# Patient Record
Sex: MALE | Race: WHITE
[De-identification: names, ages, dates, MRNs, and addresses within clinical notes are randomized per-mention and may not be internally consistent; named-entity substitution may affect disease eponyms.]

---

## 2019-12-31 ENCOUNTER — HOSPITAL ENCOUNTER (EMERGENCY)
Dept: HOSPITAL 92 - ERS | Age: 74
Discharge: NURSING, REHAB OR LONG-TERM CARE | End: 2019-12-31
Payer: MEDICARE

## 2019-12-31 DIAGNOSIS — Z04.3: Primary | ICD-10-CM

## 2019-12-31 DIAGNOSIS — K21.9: ICD-10-CM
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
## 2019-12-31 DIAGNOSIS — F41.9: ICD-10-CM

## 2019-12-31 DIAGNOSIS — M19.90: ICD-10-CM

## 2019-12-31 DIAGNOSIS — G40.909: ICD-10-CM

## 2019-12-31 DIAGNOSIS — Z79.899: ICD-10-CM

## 2019-12-31 DIAGNOSIS — F03.90: ICD-10-CM

## 2019-12-31 PROCEDURE — 99283 EMERGENCY DEPT VISIT LOW MDM: CPT

## 2020-05-14 ENCOUNTER — HOSPITAL ENCOUNTER (EMERGENCY)
Dept: HOSPITAL 92 - ERS | Age: 75
Discharge: HOME | End: 2020-05-14
Payer: MEDICARE

## 2020-05-14 DIAGNOSIS — R41.82: Primary | ICD-10-CM

## 2020-05-14 DIAGNOSIS — G40.909: ICD-10-CM

## 2020-05-14 DIAGNOSIS — Z79.899: ICD-10-CM

## 2020-05-14 DIAGNOSIS — F03.90: ICD-10-CM

## 2020-05-14 DIAGNOSIS — F41.9: ICD-10-CM

## 2020-05-14 DIAGNOSIS — M19.90: ICD-10-CM

## 2020-05-14 LAB
ALBUMIN SERPL BCG-MCNC: 4.1 G/DL (ref 3.4–4.8)
ALP SERPL-CCNC: 54 U/L (ref 40–110)
ALT SERPL W P-5'-P-CCNC: (no result) U/L (ref 8–55)
ANION GAP SERPL CALC-SCNC: 13 MMOL/L (ref 10–20)
AST SERPL-CCNC: 15 U/L (ref 5–34)
BASOPHILS # BLD AUTO: 0 THOU/UL (ref 0–0.2)
BASOPHILS NFR BLD AUTO: 0.4 % (ref 0–1)
BILIRUB SERPL-MCNC: 0.7 MG/DL (ref 0.2–1.2)
BUN SERPL-MCNC: 15 MG/DL (ref 8.4–25.7)
CALCIUM SERPL-MCNC: 8.9 MG/DL (ref 7.8–10.44)
CHLORIDE SERPL-SCNC: 104 MMOL/L (ref 98–107)
CO2 SERPL-SCNC: 26 MMOL/L (ref 23–31)
CREAT CL PREDICTED SERPL C-G-VRATE: 0 ML/MIN (ref 70–130)
EOSINOPHIL # BLD AUTO: 0.1 THOU/UL (ref 0–0.7)
EOSINOPHIL NFR BLD AUTO: 1.2 % (ref 0–10)
GLOBULIN SER CALC-MCNC: 2.5 G/DL (ref 2.4–3.5)
GLUCOSE SERPL-MCNC: 111 MG/DL (ref 83–110)
HGB BLD-MCNC: 15.5 G/DL (ref 14–18)
LYMPHOCYTES # BLD: 0.9 THOU/UL (ref 1.2–3.4)
LYMPHOCYTES NFR BLD AUTO: 12.8 % (ref 21–51)
MCH RBC QN AUTO: 32.6 PG (ref 27–31)
MCV RBC AUTO: 99.1 FL (ref 78–98)
MONOCYTES # BLD AUTO: 0.7 THOU/UL (ref 0.11–0.59)
MONOCYTES NFR BLD AUTO: 9.3 % (ref 0–10)
NEUTROPHILS # BLD AUTO: 5.6 THOU/UL (ref 1.4–6.5)
NEUTROPHILS NFR BLD AUTO: 76.3 % (ref 42–75)
PLATELET # BLD AUTO: 109 THOU/UL (ref 130–400)
POTASSIUM SERPL-SCNC: 4.1 MMOL/L (ref 3.5–5.1)
RBC # BLD AUTO: 4.75 MILL/UL (ref 4.7–6.1)
SODIUM SERPL-SCNC: 139 MMOL/L (ref 136–145)
WBC # BLD AUTO: 7.4 THOU/UL (ref 4.8–10.8)

## 2020-05-14 PROCEDURE — 93005 ELECTROCARDIOGRAM TRACING: CPT

## 2020-05-14 PROCEDURE — 85025 COMPLETE CBC W/AUTO DIFF WBC: CPT

## 2020-05-14 PROCEDURE — 80053 COMPREHEN METABOLIC PANEL: CPT

## 2020-05-14 PROCEDURE — 71045 X-RAY EXAM CHEST 1 VIEW: CPT

## 2020-05-14 PROCEDURE — 84484 ASSAY OF TROPONIN QUANT: CPT

## 2020-05-14 PROCEDURE — 81003 URINALYSIS AUTO W/O SCOPE: CPT

## 2020-05-14 PROCEDURE — 36415 COLL VENOUS BLD VENIPUNCTURE: CPT

## 2020-05-14 PROCEDURE — 51701 INSERT BLADDER CATHETER: CPT

## 2020-05-14 PROCEDURE — 80177 DRUG SCRN QUAN LEVETIRACETAM: CPT

## 2020-05-14 NOTE — RAD
PORTABLE CHEST ONE VIEW:

5/14/20 at 11:48 a.m.

 

HISTORY:

Syncope. 

 

FINDINGS: 

The heart size is normal. No focal areas of consolidation, pneumothoraces, or pleural effusions are s
een. There is evidence of old granulomatous disease. 

 

IMPRESSION: 

No acute process. 

 

POS: MAYITO

## 2020-05-30 ENCOUNTER — HOSPITAL ENCOUNTER (INPATIENT)
Dept: HOSPITAL 92 - ERS | Age: 75
LOS: 3 days | Discharge: HOSPICE | DRG: 871 | End: 2020-06-02
Attending: INTERNAL MEDICINE | Admitting: INTERNAL MEDICINE
Payer: MEDICARE

## 2020-05-30 VITALS — BODY MASS INDEX: 21.9 KG/M2

## 2020-05-30 DIAGNOSIS — Z20.828: ICD-10-CM

## 2020-05-30 DIAGNOSIS — G20: ICD-10-CM

## 2020-05-30 DIAGNOSIS — F02.80: ICD-10-CM

## 2020-05-30 DIAGNOSIS — K21.9: ICD-10-CM

## 2020-05-30 DIAGNOSIS — D69.6: ICD-10-CM

## 2020-05-30 DIAGNOSIS — R47.01: ICD-10-CM

## 2020-05-30 DIAGNOSIS — E87.2: ICD-10-CM

## 2020-05-30 DIAGNOSIS — Z79.1: ICD-10-CM

## 2020-05-30 DIAGNOSIS — G93.41: ICD-10-CM

## 2020-05-30 DIAGNOSIS — Z66: ICD-10-CM

## 2020-05-30 DIAGNOSIS — F41.9: ICD-10-CM

## 2020-05-30 DIAGNOSIS — K82.A1: ICD-10-CM

## 2020-05-30 DIAGNOSIS — R65.20: ICD-10-CM

## 2020-05-30 DIAGNOSIS — K80.00: ICD-10-CM

## 2020-05-30 DIAGNOSIS — Z79.899: ICD-10-CM

## 2020-05-30 DIAGNOSIS — G40.909: ICD-10-CM

## 2020-05-30 DIAGNOSIS — M19.90: ICD-10-CM

## 2020-05-30 DIAGNOSIS — K75.0: ICD-10-CM

## 2020-05-30 DIAGNOSIS — J18.9: ICD-10-CM

## 2020-05-30 DIAGNOSIS — Z88.0: ICD-10-CM

## 2020-05-30 DIAGNOSIS — A40.8: Primary | ICD-10-CM

## 2020-05-30 DIAGNOSIS — Z51.5: ICD-10-CM

## 2020-05-30 LAB
ALBUMIN SERPL BCG-MCNC: 3.2 G/DL (ref 3.4–4.8)
ALP SERPL-CCNC: 85 U/L (ref 40–110)
ALT SERPL W P-5'-P-CCNC: 26 U/L (ref 8–55)
ANION GAP SERPL CALC-SCNC: 17 MMOL/L (ref 10–20)
AST SERPL-CCNC: 38 U/L (ref 5–34)
BASOPHILS # BLD AUTO: 0.1 THOU/UL (ref 0–0.2)
BASOPHILS NFR BLD AUTO: 0.8 % (ref 0–1)
BILIRUB SERPL-MCNC: 1.7 MG/DL (ref 0.2–1.2)
BUN SERPL-MCNC: 19 MG/DL (ref 8.4–25.7)
CALCIUM SERPL-MCNC: 7.9 MG/DL (ref 7.8–10.44)
CHLORIDE SERPL-SCNC: 102 MMOL/L (ref 98–107)
CK SERPL-CCNC: 510 U/L (ref 30–200)
CO2 SERPL-SCNC: 24 MMOL/L (ref 23–31)
CREAT CL PREDICTED SERPL C-G-VRATE: 0 ML/MIN (ref 70–130)
EOSINOPHIL # BLD AUTO: 0 THOU/UL (ref 0–0.7)
EOSINOPHIL NFR BLD AUTO: 0.6 % (ref 0–10)
GLOBULIN SER CALC-MCNC: 2.2 G/DL (ref 2.4–3.5)
GLUCOSE SERPL-MCNC: 146 MG/DL (ref 83–110)
HGB BLD-MCNC: 13 G/DL (ref 14–18)
LIPASE SERPL-CCNC: 36 U/L (ref 8–78)
LYMPHOCYTES # BLD: 0.2 THOU/UL (ref 1.2–3.4)
LYMPHOCYTES NFR BLD AUTO: 3.2 % (ref 21–51)
MCH RBC QN AUTO: 32.8 PG (ref 27–31)
MCV RBC AUTO: 99.6 FL (ref 78–98)
MONOCYTES # BLD AUTO: 0.4 THOU/UL (ref 0.11–0.59)
MONOCYTES NFR BLD AUTO: 5.6 % (ref 0–10)
NEUTROPHILS # BLD AUTO: 6.6 THOU/UL (ref 1.4–6.5)
NEUTROPHILS NFR BLD AUTO: 89.7 % (ref 42–75)
PLATELET # BLD AUTO: 105 THOU/UL (ref 130–400)
POTASSIUM SERPL-SCNC: 3.5 MMOL/L (ref 3.5–5.1)
PROT UR STRIP.AUTO-MCNC: 30 MG/DL
RBC # BLD AUTO: 3.98 MILL/UL (ref 4.7–6.1)
RBC UR QL AUTO: (no result) HPF (ref 0–3)
SODIUM SERPL-SCNC: 139 MMOL/L (ref 136–145)
URNS CMNT MICRO: (no result)
WBC # BLD AUTO: 7.3 THOU/UL (ref 4.8–10.8)
WBC UR QL AUTO: (no result) HPF (ref 0–3)

## 2020-05-30 PROCEDURE — 87077 CULTURE AEROBIC IDENTIFY: CPT

## 2020-05-30 PROCEDURE — 71275 CT ANGIOGRAPHY CHEST: CPT

## 2020-05-30 PROCEDURE — 51701 INSERT BLADDER CATHETER: CPT

## 2020-05-30 PROCEDURE — U0003 INFECTIOUS AGENT DETECTION BY NUCLEIC ACID (DNA OR RNA); SEVERE ACUTE RESPIRATORY SYNDROME CORONAVIRUS 2 (SARS-COV-2) (CORONAVIRUS DISEASE [COVID-19]), AMPLIFIED PROBE TECHNIQUE, MAKING USE OF HIGH THROUGHPUT TECHNOLOGIES AS DESCRIBED BY CMS-2020-01-R: HCPCS

## 2020-05-30 PROCEDURE — 96365 THER/PROPH/DIAG IV INF INIT: CPT

## 2020-05-30 PROCEDURE — 51703 INSERT BLADDER CATH COMPLEX: CPT

## 2020-05-30 PROCEDURE — 81015 MICROSCOPIC EXAM OF URINE: CPT

## 2020-05-30 PROCEDURE — 80053 COMPREHEN METABOLIC PANEL: CPT

## 2020-05-30 PROCEDURE — 96361 HYDRATE IV INFUSION ADD-ON: CPT

## 2020-05-30 PROCEDURE — 87149 DNA/RNA DIRECT PROBE: CPT

## 2020-05-30 PROCEDURE — 74177 CT ABD & PELVIS W/CONTRAST: CPT

## 2020-05-30 PROCEDURE — 71045 X-RAY EXAM CHEST 1 VIEW: CPT

## 2020-05-30 PROCEDURE — 81003 URINALYSIS AUTO W/O SCOPE: CPT

## 2020-05-30 PROCEDURE — 85379 FIBRIN DEGRADATION QUANT: CPT

## 2020-05-30 PROCEDURE — 36415 COLL VENOUS BLD VENIPUNCTURE: CPT

## 2020-05-30 PROCEDURE — 8E0ZXY6 ISOLATION: ICD-10-PCS | Performed by: INTERNAL MEDICINE

## 2020-05-30 PROCEDURE — 84484 ASSAY OF TROPONIN QUANT: CPT

## 2020-05-30 PROCEDURE — 85025 COMPLETE CBC W/AUTO DIFF WBC: CPT

## 2020-05-30 PROCEDURE — 87040 BLOOD CULTURE FOR BACTERIA: CPT

## 2020-05-30 PROCEDURE — 87186 SC STD MICRODIL/AGAR DIL: CPT

## 2020-05-30 PROCEDURE — 83690 ASSAY OF LIPASE: CPT

## 2020-05-30 PROCEDURE — 86769 SARS-COV-2 COVID-19 ANTIBODY: CPT

## 2020-05-30 PROCEDURE — 87635 SARS-COV-2 COVID-19 AMP PRB: CPT

## 2020-05-30 PROCEDURE — 83605 ASSAY OF LACTIC ACID: CPT

## 2020-05-30 PROCEDURE — 93005 ELECTROCARDIOGRAM TRACING: CPT

## 2020-05-30 PROCEDURE — 82550 ASSAY OF CK (CPK): CPT

## 2020-05-30 PROCEDURE — 96367 TX/PROPH/DG ADDL SEQ IV INF: CPT

## 2020-05-30 PROCEDURE — 83880 ASSAY OF NATRIURETIC PEPTIDE: CPT

## 2020-05-30 RX ADMIN — RISPERIDONE SCH MG: 1 TABLET, ORALLY DISINTEGRATING ORAL at 22:49

## 2020-05-30 RX ADMIN — Medication SCH ML: at 22:49

## 2020-05-30 RX ADMIN — RISPERIDONE SCH: 1 TABLET, ORALLY DISINTEGRATING ORAL at 23:24

## 2020-05-30 NOTE — CT
CTA Angio Chest W WO Con



5/30/2020 1:49 PM



Indication:  Fever, shortness of breath, tachycardia and elevated d-dimer



Technique:  Multiple CTA images were obtained of the thorax with IV contrast. 3-D rendering: MIP jeri
nstructed images were created and reviewed.



Comparison:  Chest radiograph dated May 14, 2020



Findings:



Pulmonary arteries:  No central or segmental pulmonary embolus is evident.



Heart and Aorta:  There are coronary artery and thoracic aortic calcifications. There is mild cardiom
egaly.



Mediastinum:Normal appearing.  No enlarged lymph nodes.



Lungs:Airspace consolidation is present within the posterior lateral right lower lobe. There is subse
gmental volume loss involving both lower lobes.



Pleural space:  Small bilateral pleural effusions.



Upper Abdomen:  There is a small hiatal hernia. There is a hypodense oval lesion involving the expect
ed region of the superior pole left kidney that is incompletely characterized. This measures 4.6 cm

and may reflect a prominent exophytic cyst.



Osseous Structures:  No acute osseous abnormality.



Soft tissues:No abnormality.



Other findings:None.



Impression:

1.  No central or segmental pulmonary embolus.

2.  Right lower lobe pneumonia

3.  Mild cardiomegaly and small bilateral pleural effusions suspicious for a component of mild CHF. R
ecommend correlation with clinical examination.

4.  Partially visualized hypodense lesion involving the region of the superior pole left kidney suspi
cious for possible cyst. Follow-up renal ultrasound is recommended.





Reported By: Christiano Villalpando 

Electronically Signed:  5/30/2020 4:24 PM

## 2020-05-30 NOTE — RAD
PORTABLE CHEST ONE VIEW:

5/30/20 at 1:05 p.m.

 

HISTORY: 

Low oxygen saturation and fever. 

 

COMPARISON: 

Earlier exam of 12:46 a.m. from same date. 

 

FINDINGS/IMPRESSION: 

No significant interval changes are seen. 

 

POS: OFF

## 2020-05-30 NOTE — PDOC.HHP
Hospitalist HPI





- History of Present Illness


Fever, concern for COVID 19


History of Present Illness: 





75-year-old gentleman with past medical history of Parkinson's disease, dementia

, traumatic brain injury, seizure disorder, and osteoarthritis presents with 

fever and symptoms of Covid 19. Patient is nonverbal a baseline and has lived 

in nursing facility for the past four years. History is primarily obtained from 

the patient's wife and son in person. Patient has had worsening trouble 

breathing with shortness of breath and hypoxia at nursing facility, he was 

found have a temperature and was sent over to the emergency department for 

further evaluation. Upon arrival he had a temperature reportedly of 103.9, CT 

scan of the chest was performed for and elevated D dimer which was negative for 

pulmonary embolism however demonstrated a right lower lobe pneumonia. Patient's 

labs are suggestive of Covid 19, he has a normal white blood cell count, 

elevated D dimer, low platelets, and a mild increase in his liver function 

tests. I find the patient in the emergency department he is laying flat in bed 

in no apparent distress his eyes are open any tracks around the room. He opens 

his mouth and attempts to make a few words though they are incomprehensible. 

The patient was diagnosed with sepsis in the emergency department and 

recommended admission for further management. I had a long discussion with 

patients wife and son in the conference room in the emergency department, 

greater than one hour of face-to-face time was spent with patient's family. The 

patient has lived for the fast past four years in skilled nursing facility, 

this current nursing facility has an outbreak of Covid 19 with "18 out of 22 

members who have tested positive" for the infection, per the patient's wife. 

The patient had a test on Tuesday of last week with results coming back on 

Friday being "negative". I discussed with them the possibility of false-

negative testing and the limitations of the current Covid 19 test. I had 

discussed with them at length their goals of care and they state that he is a 

do not attempt resuscitation and do not intubate at baseline. They are not 

interested in aggressive measures, they specifically do not want to start 

antibiotics or other medications to treat COVID. They want only comfort care 

measures, they do not want anymore painful or uncomfortable testing including 

no more lab draws and they are interested in Palliative care/ hospice. I will 

consult the Palliative care team and see if we can help set up hospice prior to 

discharge.





Hospitalist ROS





- Review of Systems


ROS unobtainable: due to mental status





Hospitalist History





- Past Medical History


Source: patient, family, old records





- Past Surgical History


Past Surgical History: reports: Other (craniotomy)





- Family History


Family History: reports: hypertension





- Social History


Smoking Status: Never smoker


Alcohol: reports: None


Drugs: reports: none


Living Situation: Nursing Home


Domestic Violence: Negative





- Exam


General Appearance: NAD


Eye: PERRL, anicteric sclera


ENT: normocephalic atraumatic, moist mucosa


Neck: supple, symmetric, no lymphadenopathy


Heart: no murmur, no gallops, no rubs, normal peripheral pulses


Respiratory: CTAB, no wheezes, no rales, no ronchi, normal chest expansion


Respiratory - other findings: Mild decrease in breath sounds right lower lung 

fields


Gastrointestinal: soft, non-tender, no guarding, no rigidity


Extremities: no edema


Skin: no lesions, no rashes


Neurological: cranial nerve grossly intact, no focal deficits ( pill rolling 

tremmor.)


Neurological - other findings: Classic exam of Parkinsons. Cogwheel rigidity, 

masked faces,


Musculoskeletal: generalized weakness


Psychiatric: not oriented, flat affect





Hospitalist Results





- Labs


Result Diagrams: 


 05/30/20 13:00





 05/30/20 13:00


Lab results: 


 











WBC  7.3 thou/uL (4.8-10.8)   05/30/20  13:00    


 


Hgb  13.0 g/dL (14.0-18.0)  L  05/30/20  13:00    


 


Hct  39.6 % (42.0-52.0)  L  05/30/20  13:00    


 


MCV  99.6 fL (78.0-98.0)  H  05/30/20  13:00    


 


Plt Count  105 thou/uL (130-400)  L  05/30/20  13:00    


 


Neutrophils %  89.7 % (42.0-75.0)  H  05/30/20  13:00    


 


Sodium  139 mmol/L (136-145)   05/30/20  13:00    


 


Potassium  3.5 mmol/L (3.5-5.1)   05/30/20  13:00    


 


Chloride  102 mmol/L ()   05/30/20  13:00    


 


Carbon Dioxide  24 mmol/L (23-31)   05/30/20  13:00    


 


BUN  19 mg/dL (8.4-25.7)   05/30/20  13:00    


 


Creatinine  1.06 mg/dL (0.7-1.3)   05/30/20  13:00    


 


Glucose  146 mg/dL ()  H  05/30/20  13:00    


 


Lactic Acid  1.0 mmol/L (0.5-2.2)   05/30/20  18:07    


 


Calcium  7.9 mg/dL (7.8-10.44)   05/30/20  13:00    


 


Total Bilirubin  1.7 mg/dL (0.2-1.2)  H  05/30/20  13:00    


 


AST  38 U/L (5-34)  H  05/30/20  13:00    


 


ALT  26 U/L (8-55)   05/30/20  13:00    


 


Alkaline Phosphatase  85 U/L ()   05/30/20  13:00    


 


Creatine Kinase  510 U/L ()  H  05/30/20  13:00    


 


Troponin I  0.012 ng/mL (< 0.028)   05/30/20  13:00    


 


B-Natriuretic Peptide  33.5 pg/mL (0-100)   05/30/20  13:00    


 


Serum Total Protein  5.4 g/dL (5.8-8.1)  L  05/30/20  13:00    


 


Albumin  3.2 g/dL (3.4-4.8)  L  05/30/20  13:00    


 


Lipase  36 U/L (8-78)   05/30/20  13:00    


 


Urine Ketones  Negative mg/dL (Negative)   05/30/20  18:00    


 


Urine Blood  Small  (Negative)  A  05/30/20  18:00    


 


Urine Nitrite  Negative  (Negative)   05/30/20  18:00    


 


Ur Leukocyte Esterase  Negative  (Negative)   05/30/20  18:00    


 


Urine RBC  4-6 HPF (0-3)  A  05/30/20  18:00    


 


Urine WBC  0-3 HPF (0-3)   05/30/20  18:00    


 


Ur Squamous Epith Cells  None Seen HPF (0-3)   05/30/20  18:00    


 


Urine Bacteria  None Seen HPF (None Seen)   05/30/20  18:00    














- Radiology Interpretation


  ** CT scan - chest


Status: image reviewed by me





Hospitalist H&P A/P





- Problem


(1) COVID-19


Code(s): U07.1 - COVID-19   Status: Acute   





(2) Pneumonia due to COVID-19 virus


Code(s): U07.1 - COVID-19; J12.89 - OTHER VIRAL PNEUMONIA   Status: Acute   





(3) Shortness of breath


Code(s): R06.02 - SHORTNESS OF BREATH   Status: Acute   





(4) Fever


Code(s): R50.9 - FEVER, UNSPECIFIED   Status: Acute   





(5) Seizure


Code(s): R56.9 - UNSPECIFIED CONVULSIONS   Status: Acute   





(6) Parkinson disease


Code(s): G20 - PARKINSON'S DISEASE   Status: Acute   





(7) Traumatic brain injury


Code(s): S06.9X9A - UNSP INTRACRANIAL INJURY W LOC OF UNSP DURATION, INIT   

Status: Acute   





(8) Thrombocytopenia


Code(s): D69.6 - THROMBOCYTOPENIA, UNSPECIFIED   Status: Acute   





(9) Lactic acid acidosis


Code(s): E87.2 - ACIDOSIS   Status: Acute   





(10) Elevated liver enzymes


Code(s): R74.8 - ABNORMAL LEVELS OF OTHER SERUM ENZYMES   Status: Acute   





- Plan


Plan: 





Plan:


Admit to medical unit


Palliative care consultation, recommendations appreciated


Goals of family is to have palliative care/hospice set up, at the [facility] where he 

is a chronic resident


Family DO NOT want treatment of COVID19 and declined antibiotics or other such 

therapy


Family DO want comfort care, pain control, cough aid, pain medications, anxiety 

medications as needed


Patient is a DNAR prior to admission, we will honor patients and families wishes


No further lab draws or invasive measure


Conintue home: Sinemet, Keppra, Risperdal, Protonix


Added PRN medications for pain, sleep, cough, anxiety, tylenol for fever


Symptomatic care





Greater than 85 minutes of care provided, with 60 minutes of face to face time 

spent with patient and family

## 2020-05-31 RX ADMIN — RISPERIDONE SCH MG: 1 TABLET, ORALLY DISINTEGRATING ORAL at 20:18

## 2020-05-31 RX ADMIN — Medication SCH ML: at 20:18

## 2020-05-31 RX ADMIN — RISPERIDONE SCH MG: 1 TABLET, ORALLY DISINTEGRATING ORAL at 09:37

## 2020-05-31 RX ADMIN — Medication SCH ML: at 09:09

## 2020-05-31 RX ADMIN — RISPERIDONE SCH MG: 1 TABLET, ORALLY DISINTEGRATING ORAL at 15:12

## 2020-05-31 NOTE — PDOC.HOSPP
- Subjective


Subjective: 





Seen and examined. Fever in the night up to 102.2, down trended with Tylenol 

this AM. Clinically seems more lethargic and worse. Very rigid and stiff 

muscles from Parkinson's disease. I called the patient's wife, Tati at [phone], time was given for questions, all answered in detail. I updated her to 

the critical nature of his condition including the fact that he now has 

bacteremia. She wishes to stay the course and is interested in hospice/

palliative care. She does not want the addition of antibiotics. She does not 

want the addition of more lab draws an invasive testing which would cause him 

discomfort. He is on airborne precautions with a high likelihood that he has 

Covid virus, in addition to right lower lobe pneumonia secondary to the Covid 

virus. Patient has developed bacteremia with two out of two blood cultures 

being positive for Streptococcus. Prognosis is poor, patients family understand 

this and wish to continue his DNR and DNI status. Greater than 20 minutes spent 

on the phone with patients wife.





- Objective


Vital Signs & Weight: 


 Vital Signs (12 hours)











  Temp Pulse Resp BP BP Pulse Ox


 


 05/31/20 12:00  98.8 F  65  18  90/63   96


 


 05/31/20 08:00  97.5 F L  65  18   93/56 L  96


 


 05/31/20 05:26  102.2 F H     


 


 05/31/20 03:48  101.8 F H  76  18   116/87  96


 


 05/31/20 01:29  101 F H  64  20   93/88  96








 Weight











Weight                         162 lb














I&O: 


 











 05/30/20 05/31/20 06/01/20





 06:59 06:59 06:59


 


Intake Total   360


 


Output Total  700 


 


Balance  -700 360











Result Diagrams: 


 05/30/20 13:00





 05/30/20 13:00


Radiology Reviewed by me: Yes





Hospitalist ROS





- Review of Systems


All other systems reviewed; all pertinent +/- noted in HPI/Subj





- Medication


Medications: 


Active Medications











Generic Name Dose Route Start Last Admin





  Trade Name Freq  PRN Reason Stop Dose Admin


 


Acetaminophen  650 mg  05/30/20 21:04  05/31/20 03:36





  Tylenol  AL   650 mg





  Q4H PRN   Administration





  Headache/Fever/Mild Pain (1-3)   





     





     





     


 


Carbidopa/Levodopa  2 tab  05/31/20 09:00  05/31/20 09:08





  Sinemet   PO   2 tab





  QAM BRYAN   Administration





     





     





     





     


 


Levetiracetam 500 mg/ Device  100 mls @ 200 mls/hr  05/31/20 09:00  05/31/20 09:

09





  IVPB   100 mls





  BID BRYAN   Administration





     





     





     





     


 


Pantoprazole Sodium  40 mg  05/31/20 09:00  05/31/20 09:09





  Protonix  PO   40 mg





  DAILY BRYAN   Administration





     





     





     





     


 


Risperidone  0.5 mg  05/30/20 21:00  05/31/20 09:37





  Risperdal M-Tab  SL   0.5 mg





  TID BRYAN   Administration





     





     





     





     


 


Sodium Chloride  10 ml  05/30/20 21:00  05/31/20 09:09





  Flush - Normal Saline  IVF   10 ml





  Q12HR BRYAN   Administration





     





     





     





     














- Exam


General Appearance: ill appearing


Eye: PERRL


ENT: normocephalic atraumatic, moist mucosa


Neck: supple, symmetric, no lymphadenopathy


Heart: no murmur, no gallops, no rubs


Respiratory: no rales, no ronchi, normal chest expansion, wheezes (few faint 

right lower quadrant)


Gastrointestinal: soft, non-tender, no guarding, no rigidity


Extremities: no edema


Skin: no lesions, no rashes


Neurological: cranial nerve grossly intact, no focal deficits


Neurological - other findings: severe rigidity from parkinsons, masked faces, 

tremor of hand


Musculoskeletal: generalized weakness


Psychiatric: not oriented, flat affect





Hosp A/P


(1) COVID-19


Code(s): U07.1 - COVID-19   Status: Acute   





(2) Pneumonia due to COVID-19 virus


Code(s): U07.1 - COVID-19; J12.89 - OTHER VIRAL PNEUMONIA   Status: Acute   





(3) Shortness of breath


Code(s): R06.02 - SHORTNESS OF BREATH   Status: Acute   





(4) Fever


Code(s): R50.9 - FEVER, UNSPECIFIED   Status: Acute   





(5) Seizure


Code(s): R56.9 - UNSPECIFIED CONVULSIONS   Status: Acute   





(6) Parkinson disease


Code(s): G20 - PARKINSON'S DISEASE   Status: Acute   





(7) Traumatic brain injury


Code(s): S06.9X9A - UNSP INTRACRANIAL INJURY W LOC OF UNSP DURATION, INIT   

Status: Acute   





(8) Thrombocytopenia


Code(s): D69.6 - THROMBOCYTOPENIA, UNSPECIFIED   Status: Acute   





(9) Lactic acid acidosis


Code(s): E87.2 - ACIDOSIS   Status: Acute   





(10) Elevated liver enzymes


Code(s): R74.8 - ABNORMAL LEVELS OF OTHER SERUM ENZYMES   Status: Acute   





- Plan





Plan:


medical unit


palliative care consultation, recommendations appreciated


I have briefly discussed the case with Chanda Bonilla, who will see the 

patient tomorrow and help coordinate care


goals of family is to have palliative care/hospice set up at the [facility] where he 

is a chronic resident


family DO NOT want treatment for Covid 19 and decline antibiotics or other such 

medications


family DO want comfort care, pain control, cough aid, pain medications, and 

anxiety medications if needed


patient is a do not attempt resuscitation and do not intubate prior to admission

, we will honor patient's and family's wishes


no further lab draws or invasive measures


will continue a regular diet, pleasure feeding  patient wife has concerns for 

aspiration prior to admission though want to continue to feed him, 

understanding that aspiration can happen


continue home medications: Sinemet, Keppra, Risperdal, protonic's


added PRN medications for pain, sleep, cough, Tylenol for fever


symptomatic care


Bacteremia seen, again offered antibiotics though patients family declined. His 

overall quality of life is poor and they are not interested in aggressive life 

sustaining measures. 








Disposition. Family understand terminal condition without treatment, interested 

in hospice care only. Greater than 20 minutes spent on the phone with patient's 

wife Tati, coordinated care with palliative care nurse practitioner, 

discussed with nurse who is caring for the patient today. Total time caring for 

patient greater than 45 minutes.

## 2020-06-01 RX ADMIN — RISPERIDONE SCH MG: 1 TABLET, ORALLY DISINTEGRATING ORAL at 21:25

## 2020-06-01 RX ADMIN — Medication SCH ML: at 09:39

## 2020-06-01 RX ADMIN — Medication SCH ML: at 21:25

## 2020-06-01 RX ADMIN — RISPERIDONE SCH MG: 1 TABLET, ORALLY DISINTEGRATING ORAL at 14:19

## 2020-06-01 RX ADMIN — RISPERIDONE SCH MG: 1 TABLET, ORALLY DISINTEGRATING ORAL at 09:37

## 2020-06-01 NOTE — PDOC.HOSPP
- Subjective


Encounter Date: 06/01/20


Encounter Time: 10:15


Subjective: 





is calm, in no distress


eyes are closed





- Objective


Vital Signs & Weight: 


 Vital Signs (12 hours)











  Temp Pulse Resp BP Pulse Ox


 


 06/01/20 12:00  98.4 F  75  20  107/64  95


 


 06/01/20 08:00  99.2 F  70  22 H  109/70  94 L


 


 06/01/20 04:22      95


 


 06/01/20 03:46  99.2 F  75  20  117/68  95








 Weight











Weight                         162 lb














I&O: 


 











 05/31/20 06/01/20 06/02/20





 06:59 06:59 06:59


 


Intake Total  1060 


 


Output Total 700 675 


 


Balance -700 385 











Result Diagrams: 


 05/30/20 13:00





 05/30/20 13:00





Hospitalist ROS





- Medication


Medications: 


Active Medications











Generic Name Dose Route Start Last Admin





  Trade Name Freq  PRN Reason Stop Dose Admin


 


Acetaminophen  650 mg  05/30/20 20:22  05/31/20 15:24





  Tylenol  PO   650 mg





  Q4H PRN   Administration





  Headache/Fever/Mild Pain (1-3)   





     





     





     


 


Acetaminophen  650 mg  05/30/20 21:04  05/31/20 03:36





  Tylenol  NC   650 mg





  Q4H PRN   Administration





  Headache/Fever/Mild Pain (1-3)   





     





     





     


 


Carbidopa/Levodopa  2 tab  05/31/20 09:00  06/01/20 09:38





  Sinemet   PO   2 tab





  QAM BRYAN   Administration





     





     





     





     


 


Levetiracetam 500 mg/ Device  100 mls @ 200 mls/hr  05/31/20 09:00  06/01/20 09:

37





  IVPB   100 mls





  BID BRYAN   Administration





     





     





     





     


 


Vancomycin HCl 1 gm/ Device  200 mls @ 200 mls/hr  06/01/20 11:00  06/01/20 11:

35





  IVPB   200 mls





  1100,2300 BRYAN   Administration





     





     





     





     


 


Pantoprazole Sodium  40 mg  05/31/20 09:00  06/01/20 09:39





  Protonix  PO   40 mg





  DAILY BRYAN   Administration





     





     





     





     


 


Risperidone  0.5 mg  05/30/20 21:00  06/01/20 09:37





  Risperdal M-Tab  SL   0.5 mg





  TID BRYAN   Administration





     





     





     





     


 


Sodium Chloride  10 ml  05/30/20 21:00  06/01/20 09:39





  Flush - Normal Saline  IVF   10 ml





  Q12HR BRYAN   Administration





     





     





     





     














- Exam


Eye: anicteric sclera


ENT: no oropharyngeal lesions, dry oral mucosa


Neck: supple, no JVD


Heart: RRR, no murmur


Respiratory: no wheezes, no rales


Gastrointestinal: soft, non-tender, non-distended, normal bowel sounds


Extremities: no cyanosis, no edema


Neurological: cranial nerve grossly intact, no focal deficits





Hosp A/P


(1) Sepsis


Code(s): A41.9 - SEPSIS, UNSPECIFIED ORGANISM   Status: Acute   


Qualifiers: 


   Sepsis type: Streptococcus, other   Sepsis acute organ dysfunction status: 

with acute organ dysfunction   Severe sepsis acute organ dysfunction type: 

encephalopathy   Severe sepsis shock status: without septic shock   Qualified 

Code(s): A40.8 - Other streptococcal sepsis; R65.20 - Severe sepsis without 

septic shock; G93.41 - Metabolic encephalopathy   





(2) Streptococcal bacteremia


Code(s): R78.81 - BACTEREMIA; B95.5 - UNSP STREPTOCOCCUS AS THE CAUSE OF 

DISEASES CLASSD ELSWHR   Status: Acute   





(3) COVID-19


Code(s): U07.1 - COVID-19   Status: Suspected   





(4) Parkinson disease


Code(s): G20 - PARKINSON'S DISEASE   Status: Chronic   





(5) Seizure


Code(s): R56.9 - UNSPECIFIED CONVULSIONS   Status: Chronic   





(6) Traumatic brain injury


Code(s): S06.9X9A - UNSP INTRACRANIAL INJURY W LOC OF UNSP DURATION, INIT   

Status: Chronic   


Qualifiers: 


   Encounter type: sequela 





- Plan





he has had 2 covid tests done at his snf ([facility]) and one here on 5/30/2020 all 

of which are -ve


strep anginosus bacteremia, echo is ordered, is on vanc, ID consultation, has 

pcn allergy


continue droplet precautions until cleared by 


d/w wife and children over phone


continue current meds as above


mobilize with RW as tolerated


encourage po intake

## 2020-06-02 VITALS — SYSTOLIC BLOOD PRESSURE: 109 MMHG | DIASTOLIC BLOOD PRESSURE: 65 MMHG

## 2020-06-02 VITALS — TEMPERATURE: 97.3 F

## 2020-06-02 LAB — SARS-COV-2 IGG SERPL IA-ACNC: 0.01 S/CO (ref ?–1.4)

## 2020-06-02 RX ADMIN — Medication SCH ML: at 08:22

## 2020-06-02 RX ADMIN — RISPERIDONE SCH MG: 1 TABLET, ORALLY DISINTEGRATING ORAL at 08:17

## 2020-06-02 RX ADMIN — RISPERIDONE SCH: 1 TABLET, ORALLY DISINTEGRATING ORAL at 08:41

## 2020-06-02 NOTE — CON
DATE OF CONSULTATION:  06/02/2020



REQUESTING PHYSICIAN:  Rudy Flores MD



HISTORY OF PRESENT ILLNESS:  This is a 75-year-old  man, a resident of 
an

extended care facility. 



The patient is poorly functional at baseline and aphasic over the last 4 years. 



The patient was admitted on 05/30/2020 with worsening dyspnea, associated with

fever. 



Here, COVID-19 has been excluded. 



Additional septic workup included blood cultures, which were positive 2/2 for

Streptococcus anginosus.  Subsequent CT scan of the abdomen and pelvis obtained

yesterday was remarkable for gangrenous gallbladder, which may have ruptured 
into

the liver, causing a liver abscess. 



According to the patient's nurse, the patient apparently is declining in 
status. 



Additional history is obtained from chart review and from speaking with the

patient's family. 



PAST MEDICAL HISTORY:  Significant for:

1. Parkinson disease.

2. Traumatic brain injury.

3. Senile dementia of Alzheimer's type.

4. Epileptic seizure disorder.

5. Degenerative arthritic disease and overall debility.



PAST SURGICAL HISTORY:  Pertinent for craniectomy in the distant past.



FAMILY HISTORY:  Noncontributory for this patient's age.



SOCIAL HISTORY:  The patient is a resident of an extended care facility.  He 
has no

cigarette smoking, ethanol, or illicit drug abuse history. 



CURRENT MEDICATIONS:  Include vancomycin and ceftriaxone intravenously.

Additionally, the patient is on: 

1. Protonix 40 mg p.o. daily.

2. Risperdal 0.5 mg sublingual t.i.d.

3. Morphine sulfate p.r.n. pain.

4. Keppra 500 mg IV b.i.d.

5. Carbidopa/levodopa two tablets p.o. q.a.m.

6. Tylenol p.r.n. fever.



ALLERGIES:  TO PENICILLIN.



REVIEW OF SYSTEMS:  Could not be obtained due to this patient's current mental

status. 



PHYSICAL EXAMINATION:

GENERAL:  This reveals a 75-year-old man who otherwise appears in no acute 
distress. 

VITAL SIGNS:  Current vital signs include blood pressure 111/60, pulse 59,

respiratory rate is 18, temperature 97.3 degrees Fahrenheit, maximum 
temperature in

last 24 hours is 102.3 degrees Fahrenheit, and oxygen saturation is 98% on room 
air. 

HEART:  Reveals regular rate with mild sinus bradycardia.  No murmurs or gallops

auscultated. 

LUNGS:  Clear to auscultation bilaterally.  Breathing, regular and nonlabored. 

ABDOMEN:  Soft with minimum tenderness in the right upper quadrant with no 
rebound

tenderness present. 



DIAGNOSTIC STUDIES:  I have personally reviewed the CT scan of the abdomen and

pelvis obtained on 06/01/2020 and this reveals cholelithiasis with dilated

gallbladder, gallbladder wall thickening, as well as an apparent intrahepatic

rupture of the gallbladder itself with possible abscess. 



IMPRESSIONS:

 1. Acute gangrenous cholecystitis/ Cholelithiasis

 2. Streptococcus anginosus bacteremia



RECOMMENDATIONS:  

1. Under normal circumstances, this findings warrant surgical intervention to

include cholecystectomy and drainage of hepatic abscess in addition to the 
already

prescribed broad-spectrum antibiotic therapy. 

2. I have discussed the above findings and recommendations with the patient's 
family

by telephone conversation. 

3. I did inform them that if the surgical intervention is undertaken, this is 
with

potential risks for bleeding, infection, injury to bile duct or surrounding

structures. 

4. Additionally, due to this patient's significant comorbidities, this patient 
may

or may not be able to return to his already poorly functional baseline status. 

5. The patient's family have declined any surgical intervention at this time and

instead have elected comfort care measures.  They do so with full knowledge that

this may certainly lead to this patient's demise. 

6. The patient's family were thankful for the information provided and I have 
also

agreed with the decision to pursue hospice care at this time. 

Thank you again, Dr. Flores, for allowing me the opportunity to 
participate in

the care of this patient. 







Job ID:  [id number]



[facility]

## 2020-06-02 NOTE — PDOC.PALCO
Palliative Care Consult





- Consult Details


Requesting Physician: Dr Mcmahon


Reason for Consult: goals of care, family support, complex decision-making


Family Members Present: Family via phone





- Pertinent HPI





75 year old male who resides at a nursing home facility. Known decline over the 

past 4 years requires assistance with ADL's and has been aphasic over the past 

4 years. Onset of shortness of breath and difficulity breathing, no mitigating 

factors and after onset of fever presented to the  emergency room for further 

evaluation. Secondary to Covid breakout at [facility] patient was tested for 

Covid 19 and admitted, placed on precautions.  Family has communicated to Dr Mcmahon and Palliative Care they are not interested in aggressive measures, but 

desire for Mr Bedolla to be comfortable.  





- Pertinent PMH





Parkinsons disease, dementia, TBI, Seizure disorder, 





- Social History


Smoking Status: Never smoker


Smoking: no tobacco exposure


Alcohol Use: none


Drug Use History: none


Living Situation: , nursing home resident





- Medications


MAR Reviewed: Yes





- Allergies


Allergies/Adverse Reactions: 


 Allergies











Allergy/AdvReac Type Severity Reaction Status Date / Time


 


Penicillins Allergy   Verified 05/30/20 21:05














- Subjective





Eyes remain closed, garbled speech with repetitive pattern. Not a reliable 

source for ROS





- ROS


Non Response: due to mental status





- Objective


Vital Signs: 


 Vital Signs - Most Recent











Temp Pulse Resp BP Pulse Ox


 


 97.3 F L  59 L  18   111/60   98 


 


 06/02/20 08:00  06/02/20 08:00  06/02/20 08:00  06/02/20 08:00  06/02/20 08:00











Palliative Performance Scale: 20





- Physical Exam


Constitutional: cachectic, confusion, encephalitic, ill appearing


HEENT: moist MMs, sclera anicteric


Respiratory: no wheezing, diminished lung sound


Cardiovascular: RRR, diminished peripheral pulses


Gastrointestinal: soft, non-tender, incontinent


Genitourinary: forbes catheter


Musculoskeletal: pulses present, diffuse muscle atrophy


Lymphatic: no nodes


Skin: no lesions, no rash


Deviation from normal: Pallor, delayed cap refill to lower ext


Deviation from normal: Unable to determine orientation





- Problem List


(1) Declining functional status


Code(s): R53.81 - OTHER MALAISE   Current Visit: Yes   Status: Acute   





(2) Palliative care encounter


Code(s): Z51.5 - ENCOUNTER FOR PALLIATIVE CARE   Current Visit: Yes   Status: 

Acute   





(3) Sepsis


Code(s): A41.9 - SEPSIS, UNSPECIFIED ORGANISM   Current Visit: Yes   Status: 

Acute   


Qualifiers: 


   Sepsis acute organ dysfunction status: with acute organ dysfunction   Severe 

sepsis acute organ dysfunction type: encephalopathy   Severe sepsis shock status

: without septic shock 





(4) Shortness of breath


Code(s): R06.02 - SHORTNESS OF BREATH   Current Visit: Yes   Status: Acute   





(5) Parkinson disease


Code(s): G20 - PARKINSON'S DISEASE   Current Visit: Yes   Status: Chronic   





(6) Traumatic brain injury


Code(s): S06.9X9A - UNSP INTRACRANIAL INJURY W LOC OF UNSP DURATION, INIT   

Current Visit: Yes   Status: Chronic   


Qualifiers: 


   Encounter type: sequela 





- Plan/Recommendations


Plan:





Family hopeful to transition to the [place] with [facility].  CM has 

communicated with both the [facility] and [facility]. 


Wife reports OOHDNAR already in place, FORD Ramirez RN Pallaitive Care has 

communicated in relation to this.


Dr Posadas has seen Mr Bedolla secondary to consideration for surgical 

intervention of cholecystectomy and drainage of identified abscess, for which 

he is currently on abx. Family has elected palliation for this and no surgical 

intervention.





Primary goal of family is discharge with Hospice to the [place]. Family hopeful 

for transition so that they can be with Mr Bedolla.





Please also refer to FORD Ramirez RN notes Palliative Care














[60] minutes spent on this encounter with >50% of the time in counseling and 

coordination of care.





Thank you for this very appropriate consult.

## 2020-06-02 NOTE — CON
DATE OF CONSULTATION:  2020



REASON FOR CONSULTATION:  Bacteremia.



HISTORY OF PRESENT ILLNESS:  A 75-year-old with history of dementia, Parkinson

disease who is a resident at [place] [facility].  The patient developed

fever and hypoxemia and because of the outbreak in that location, there was a

concern with COVID pneumonia.  He had been tested two days before and was told 
to be

negative.  He was stated the second time negative.  He has a do not resuscitate

status.  He was brought to the hospital and they were getting ready for hospice

admission, but then his blood cultures turn positive.  He has been started on

antimicrobials.  Currently, Mr. Bedolla is awake.  He does not seem to be in

distress, but he is completely unable to provide any meaningful information.  He

will establish eye contact briefly, but does not follow commands.  Does not 
reply to

questions.  Pretty much stays within himself, absorbed in his own thoughts or

delusional thinking process.  According to the nurse, there has been no 
diarrhea.

No seizure activity. 



PAST MEDICAL HISTORY:  Parkinson disease, dementia, osteoarthritis, epilepsy,

dysphagia, GERD. 



PAST SURGICAL HISTORY:  Some neurosurgical procedure in the past, not clear 
which.

Hemorrhoids surgery. 



SOCIAL HISTORY:  Never smoker.  Lives in a nursing home.



ALLERGIES:  PENICILLIN.



MEDICATIONS:  

1. Carbidopa levodopa.

2. Pantoprazole.

3. Risperdal.

4. Keppra.

5. Meloxicam. 

Here, he is receivin. Norco.

2. DuoNeb.

3. Tessalon.

4. Dulcolax.

5. Sinemet.

6. Benadryl.

7. Normodyne.

8. Vancomycin.



PHYSICAL EXAMINATION:

VITAL SIGNS:  T-max was 102 when he came in, now he is defervesced.  /56,

pulse 70, respirations 16 to 20, O2 saturation 95. 

SKIN:  No areas of skin breakdown.  The patient is voiding with an indwelling 
Bullock

catheter.  He has peripheral IV access.  No lymphadenopathy. 

HEENT:  Ocular movements are conjugate.  Oral cavity is still with quite a few 
teeth

in place.  Somewhat dry oral cavity. 

LUNGS:  With diminished breath sounds on the right base.  A few crackles there. 

HEART:  S1-S2 regular rate. 

ABDOMEN:  Moderately distended.  A little bit of guarding in various parts of 
the

abdomen when palpated, particularly the left lower quadrant.  No ascites.  No

bladder distention.  No genital abnormality except for indwelling Bullock 
catheter.

No joint inflammatory activity.  No edema.  He has diffuse stiffness.  He does 
not

interact with the examiner in the meaningful fashion.  Plantar responses are

downgoing. 



LABORATORY DATA:  WBC count 7.3, hemoglobin 13, platelets 105,000, 89% 
neutrophils.

Sodium 139, creatinine 1.06, bilirubin 1.7, AST 38, ALT 26, alkaline 
phosphatase 85.

 , albumin 3.2.  Urinalysis, 0 to 3 wbc's.  COVID was negative in this

hospital.  Microbiology with Streptococcus anginosus identified in 2 sets of 
blood

cultures. 



IMAGING STUDIES:  There is a CT chest angiogram and this showed no pulmonary

embolism.  There is airspace consolidation present in the posterior lateral 
right

lower lobe.  There is a cyst in the kidney.  No comment is made of the liver

parenchyma.  I reviewed the images and there is one area, which might suggest a

cystic lesion in the liver parenchyma. 



ASSESSMENT:  

1. Parkinson disease, dementia, quite advanced features with inability to 
provide

any subjective information. 

2. Fever with Streptococcus anginosus bacteremia.



DISCUSSION:  Although patient originates from the institution where there has 
been

an outbreak of COVID, he has had three negative COVID tests and he has a reason 
for

his decompensation, which is the Streptococcus anginosus bacteremia.  Strep

anginosus can be associated with suppurative foci in the abdominal area 
pneumonia as

well as endocarditis, so would recommend an echocardiogram and dedicated pelvis 
CT

with oral and IV contrast to identify the origin of this bacteremia and then 
define

the treatment course.  For the time being, we will switch him back to beta-
lactam

with Rocephin, discontinue current vancomycin and it will be important to 
define the

origin of this bacteremia to allow us to establish the duration of therapy for 
this

gentleman.  In terms of his COVID diagnosis, I think although he comes from that

unit, it is probably unlikely that he has COVID and probably more likely that 
he has

this other diagnosis, which appears to be the culprit here. 







Job ID:  [id number]



[facility]

## 2020-06-02 NOTE — CT
CT ABDOMEN AND PELVIS WITH CONTRAST:

 

Date:  06/01/2020

 

HISTORY:  

Bacteremia, pain. 

 

COMPARISON:  

None. 

 

FINDINGS:

Small right pleural effusion. Mild bibasilar atelectasis. 

 

There appears to be rupture of the gallbladder fundus within the hepatic parenchyma. Abnormal enhance
ment of the gallbladder mucosa. There is cholelithiasis. Multiloculated collection within hepatic seg
ment V and VI. There is cholelithiasis within the cystic duct. Mild hyperenhancement of the right-judy
ed biliary tree, likely sequelae of cholangitis. 

 

There are punctate stones in the right inferior renal collecting system. Indwelling Bullock catheter. M
ild fullness of the right renal pelvis and proximal right ureter. Multiple left-sided parapelvic cyst
s. 

 

Mild presacral edema. Mild third spacing of fluid. Small volume free fluid in the pelvis. 

 

There are some reactive changes of the second and third portions of the duodenum. No retroperitoneal 
or periaortic adenopathy. 

 

IMPRESSION: 

1.  Findings of intrahepatic rupture of the gallbladder with intraparenchymal hepatic segment V and V
I abscesses. Surgical consultation is advised. 

 

2.  Cholangitis, predominantly of the right-sided intrahepatic biliary system. 

 

3.  Nonobstructed punctate small right renal calculus. 

 

4.  Mild hyperenhancement of the right renal pelvis and proximal ureter with low grade dilatation may
 reflect a recently passed stone. 

 

5.  Multiple parapelvic cysts left renal collecting system. 

 

6.  Cyst superior pole of left kidney has enhancing internal septations which are thin, measuring les
s than 2.0 mm. At a minimum, a follow-up renal protocol CT in 3-6 months recommended. 

 

7.  Mild third spacing of fluid and small volume free fluid within the pelvis. 

 

 

POS: HOME

## 2020-06-02 NOTE — DIS
DATE OF ADMISSION:  05/30/2020



DATE OF DISCHARGE:  06/02/2020



DISCHARGE DISPOSITION:  To [place] at [facility] with [facility].



PRIMARY DISCHARGE DIAGNOSES:  Gallbladder rupture with intrahepatic abscess, 
sepsis,

Streptococcus anginosus bacteremia, history of traumatic brain injury, history 
of

seizure disorder secondary to above, and Parkinson's disease. 



PROCEDURES DONE DURING HOSPITALIZATION:  The patient has had COVID-19 PCR done 
on

05/30/2020, which was negative, prior to which he has had 2 prior COVID test 
done at

the [facility] where he was at x2, which was negative.  CT 
angio

chest done on the day of admission showed no evidence of PE.  There is 
suspicion of

right lower lobe pneumonia.  CT of the abdomen and pelvis with contrast showed

findings of intrahepatic rupture of gallbladder with intraparenchymal hepatic

segment 5 and 6 abscess, cholangitis predominantly of the right-sided 
intrahepatic

biliary system.  Blood cultures x2 grew Streptococcus anginosus, sensitive to 
all

antibiotics.  COVID-19 PCR on the 30th was negative.  He has had a repeat COVID-
19

PCR on 06/01/2020, which was negative.  BUN 19, creatinine 1.0, total bilirubin 
1.7,

AST 38, ALT 26, alkaline phosphatase 85, and albumin 3.2.  BNP 33.  Troponin x1

negative.  Lactic acid 3.7.  D-dimer 5.53.  H and H 13 and 39, platelet count 
105,

white count of 7.3, and MCV 99 with 89% neutrophils. 



INPATIENT CONSULT:  Dr. Anguiano for Infectious Disease and Dr. Posadas for General

Surgery. 



DISCHARGE MEDICATIONS:  

1. Xanax 0.25 mg p.o. daily p.r.n.

2. Carbidopa/levodopa extended release, the dosage as before.

3. Keppra 500 mg twice daily.

4. Protonix 40 mg daily.

5. Risperdal 0.5 mg p.o. 3 times daily.

6. Senokot one tablet twice daily.

7. Levaquin 500 mg p.o. daily for 2 weeks.

8. Flagyl 500 mg p.o. 3 times daily for 2 weeks.



ALLERGIES:  ALLERGIC TO PENICILLIN.



BRIEF COURSE DURING HOSPITALIZATION:  The patient initially got admitted on the 
30th

after he was sent over from [facility] for fever.  There is 
also

concern for COVID-19 as other residents at the facility had the same.  He was

admitted to the telemetry.  He has had COVID-19 PCR x2 done here, which were

negative.  The patient's blood cultures 2/2 grew Streptococcus anginosus.  
Initial

CT angio of the chest done on admission showed findings suspicious for possible

right lower lobe infiltrate.  A CT of the abdomen and pelvis with contrast done

showed a ruptured gallbladder with intrahepatic abscess.  The patient's family

including wife and 2 children did not want to pursue any aggressive measures

including intervention radiology procedures due to his underlying medical issues
,

especially with traumatic brain injury and other comorbid issues.  In view of 
this,

family was offered Hospice Services and they want to go with [facility] 
[facility].

This has been arranged at his [facility].  He will be shortly

discharged once arrangements are made.  A total of 35 minutes was spent on

discharge plan.  Please note, I have seen and examined the patient on the day of

discharge. 







Job ID:  [id number]



MTDD

## 2020-06-06 NOTE — EKG
Test Reason : 

Blood Pressure : ***/*** mmHG

Vent. Rate : 101 BPM     Atrial Rate : 101 BPM

   P-R Int : 134 ms          QRS Dur : 092 ms

    QT Int : 322 ms       P-R-T Axes : 063 -58 060 degrees

   QTc Int : 417 ms

 

Sinus tachycardia

Left axis deviation

Abnormal ECG

 

Confirmed by RALEIGH ALDANA, MARY ([id number]),  BRENNAN ABRAHAM ([id number]) on 6/6/2020 12:23:09 PM

 

Referred By:             Confirmed By:MARY STOREY MD